# Patient Record
(demographics unavailable — no encounter records)

---

## 2024-11-05 NOTE — PROCEDURE
[FreeTextEntry1] : 11/5/24 NCS of both lower extremities were normal. Only able to do left TA muscle, which was normal. Further testing declined by patient.

## 2024-11-05 NOTE — PHYSICAL EXAM
[General Appearance - Alert] : alert [General Appearance - In No Acute Distress] : in no acute distress [Oriented To Time, Place, And Person] : oriented to person, place, and time [Impaired Insight] : insight and judgment were intact [Affect] : the affect was normal [Person] : oriented to person [Place] : oriented to place [Time] : oriented to time [Concentration Intact] : normal concentrating ability [Visual Intact] : visual attention was ~T not ~L decreased [Naming Objects] : no difficulty naming common objects [Repeating Phrases] : no difficulty repeating a phrase [Writing A Sentence] : no difficulty writing a sentence [Fluency] : fluency intact [Comprehension] : comprehension intact [Reading] : reading intact [Past History] : adequate knowledge of personal past history [Cranial Nerves Optic (II)] : visual acuity intact bilaterally,  visual fields full to confrontation, pupils equal round and reactive to light [Cranial Nerves Oculomotor (III)] : extraocular motion intact [Cranial Nerves Trigeminal (V)] : facial sensation intact symmetrically [Cranial Nerves Facial (VII)] : face symmetrical [Cranial Nerves Vestibulocochlear (VIII)] : hearing was intact bilaterally [Cranial Nerves Accessory (XI - Cranial And Spinal)] : head turning and shoulder shrug symmetric [Cranial Nerves Glossopharyngeal (IX)] : tongue and palate midline [Cranial Nerves Hypoglossal (XII)] : there was no tongue deviation with protrusion [Motor Tone] : muscle tone was normal in all four extremities [No Muscle Atrophy] : normal bulk in all four extremities [Sensation Tactile Decrease] : light touch was intact [Sensation Pain / Temperature Decrease] : pain and temperature was intact [Sensation Vibration Decrease] : vibration was intact [Proprioception] : proprioception was intact [Abnormal Walk] : normal gait [Balance] : balance was intact [2+] : Ankle jerk left 2+ [Edema] : there was no peripheral edema [Involuntary Movements] : no involuntary movements were seen [Romberg's Sign] : Romberg's sign was negtive [Past-pointing] : there was no past-pointing [Tremor] : no tremor present [Plantar Reflex Right Only] : normal on the right [Plantar Reflex Left Only] : normal on the left [___] : absent on the right [___] : absent on the left [FreeTextEntry6] : Able to active all muscle groups of both lower extremities without maintaining resistance.  [FreeTextEntry1] : No muscle rigidity of paraspinals. SLR negative

## 2024-11-05 NOTE — HISTORY OF PRESENT ILLNESS
[FreeTextEntry1] : She is referred for repeat electrodiagnostic study to discern nature of her symptoms of left more than right side of lower back and pain of left more than right side of lower extremities.  She dates onset of pain in 2003. She developed chronic low back pain with intermittent radiation to left more often than right lower extremities. At this point, she states that the pain is constant, and typically worse at night time. The pattern of radiation is not consistent, could be front, side or the back. There is tenderness of muscles in both lower extremities. Over time, she also developed weakness of left more than right legs. The weakness make her walk lobsided at times. She reports a sense of tingling and numbness affecting entire lower extremities.  She had left lower extremity NCS/EMG in 3/2024, reported normal.  She was prescribing meloxicam for pain. She states that didn't help. She discontinued the medication.
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
## 2025-02-03 NOTE — ASSESSMENT
[FreeTextEntry1] : Chronic paresthesia in all 4 extremities -  r/o neuropathy secondary to toxicity or inflammation neuropathy panel
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
## 2025-05-16 NOTE — PHYSICAL EXAM
[FreeTextEntry1] : The patient is alert and oriented x3, naming intact x3, repetition normal, follows three-step commands, and is able to participate fully in the history taking. Speech is normal with no evidence of dysarthria. Memory is intact: Immediate recall 3 out of 3, short-term 3 out of 3, remote memory intact Cranial nerves II through XII intact Motor exam: Upper and lower extremities 5 out of 5 power, normal tone. No abnormal movements noted. Sensory exam: Intact to light touch and pinprick. Romberg negative. Coordination and vestibular exam: Finger to nose intact, no evidence of truncal or appendicular ataxia. No evidence of nystagmus. no vestibular symptoms elicited with head turning during ambulation. Gait: Normal stance and gait. Reflexes: One to 2+ in upper and lower extremities. No pathological reflexes. Downgoing toes.  Spasm across mid lower back
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
## 2025-05-16 NOTE — ASSESSMENT
[FreeTextEntry1] : Yajaira Mckeon is a 44-year-old F who is here for follow-up on chronic paresthesias x4 extremities   Plan: -Start Duloxetine 30mg BID with food -Start baclofen 5mg HS for spasm -R/o Stiff person syndrome via labs -Referral to Rheumatology for +ANA -Continue to f/u with GI for abdominal pain -RTC in 3 months
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
